# Patient Record
Sex: FEMALE | URBAN - METROPOLITAN AREA
[De-identification: names, ages, dates, MRNs, and addresses within clinical notes are randomized per-mention and may not be internally consistent; named-entity substitution may affect disease eponyms.]

---

## 2017-02-15 ENCOUNTER — APPOINTMENT (RX ONLY)
Dept: URBAN - METROPOLITAN AREA TELEMEDICINE 2 | Facility: TELEMEDICINE | Age: 42
Setting detail: DERMATOLOGY
End: 2017-02-15

## 2017-02-15 VITALS — HEIGHT: 60 IN | WEIGHT: 145 LBS

## 2017-02-15 DIAGNOSIS — Z41.1 ENCOUNTER FOR COSMETIC SURGERY: ICD-10-CM

## 2017-02-15 PROCEDURE — ? PRE-OP WORKLIST

## 2017-02-15 PROCEDURE — ? RECOMMENDATIONS

## 2017-02-15 PROCEDURE — ? CONSULTATION - BREAST (COSMETIC)

## 2017-02-15 ASSESSMENT — LOCATION SIMPLE DESCRIPTION DERM
LOCATION SIMPLE: LEFT BREAST
LOCATION SIMPLE: RIGHT BREAST

## 2017-02-15 ASSESSMENT — LOCATION DETAILED DESCRIPTION DERM
LOCATION DETAILED: LEFT AREOLA
LOCATION DETAILED: RIGHT AREOLA

## 2017-02-15 ASSESSMENT — LOCATION ZONE DERM: LOCATION ZONE: TRUNK

## 2017-02-15 NOTE — PROCEDURE: RECOMMENDATIONS
Recommendation Preamble: The following recommendations were made during the visit:
Detail Level: Zone
Recommendations (Free Text): 1. Mastopexy implant exchange with capsulorropathy. Exchange saline for silicone 600-750cc\\n\\n-plan for underwire bra one week after to reduce stress on internal sutures. Discussed post-op physical restrictions.\\n-advised patient that \"side cleavage\" is not expected to significantly improve with procedure\\n-patient reports percocet gives her hallucinations. Patient prefers \\n-advised patient she may have indentations on the skin post-operatively that are secondary to internal sutures

## 2017-02-15 NOTE — PROCEDURE: PRE-OP WORKLIST
Surgery Scheduled: Mastopexy implant exchange
Detail Level: Simple
Admission Status: outpatient
Photos Taken?: yes
Date Of Surgery: 02/16/17
Surgeon: Kareem Beasley MD

## 2017-02-17 ENCOUNTER — APPOINTMENT (RX ONLY)
Dept: URBAN - METROPOLITAN AREA CLINIC 5 | Facility: CLINIC | Age: 42
Setting detail: DERMATOLOGY
End: 2017-02-17

## 2017-02-17 DIAGNOSIS — Z42.8 ENCOUNTER FOR OTHER PLASTIC AND RECONSTRUCTIVE SURGERY FOLLOWING MEDICAL PROCEDURE OR HEALED INJURY: ICD-10-CM

## 2017-02-17 PROCEDURE — ? PATIENT SPECIFIC COUNSELING

## 2017-02-17 PROCEDURE — ? COUNSELING - POST-OP CHECK, IMPLANT EXCHANGE WITH MASTOPEXY

## 2017-02-17 ASSESSMENT — LOCATION SIMPLE DESCRIPTION DERM
LOCATION SIMPLE: LEFT BREAST
LOCATION SIMPLE: RIGHT BREAST

## 2017-02-17 ASSESSMENT — LOCATION ZONE DERM: LOCATION ZONE: TRUNK

## 2017-02-17 ASSESSMENT — LOCATION DETAILED DESCRIPTION DERM
LOCATION DETAILED: RIGHT MEDIAL BREAST 3-4:00 REGION
LOCATION DETAILED: LEFT MEDIAL BREAST 8-9:00 REGION

## 2017-02-17 NOTE — PROCEDURE: PATIENT SPECIFIC COUNSELING
Detail Level: Zone
Other (Free Text): -may start underwire bra next week\\n-do not massage\\n-do not stretch out arms\\n-implant card provided\\n-bra garment fitting is provided

## 2017-02-20 ENCOUNTER — APPOINTMENT (RX ONLY)
Dept: URBAN - METROPOLITAN AREA CLINIC 5 | Facility: CLINIC | Age: 42
Setting detail: DERMATOLOGY
End: 2017-02-20

## 2017-02-20 DIAGNOSIS — Z42.8 ENCOUNTER FOR OTHER PLASTIC AND RECONSTRUCTIVE SURGERY FOLLOWING MEDICAL PROCEDURE OR HEALED INJURY: ICD-10-CM

## 2017-02-20 PROCEDURE — ? COUNSELING - POST-OP CHECK, IMPLANT EXCHANGE WITH MASTOPEXY

## 2017-02-20 PROCEDURE — ? PATIENT SPECIFIC COUNSELING

## 2017-02-20 NOTE — HPI: POST-OP CHECK, MASTOPEXY
History: Patient is doing well s/p mastopexy implant exchange and capsulorropathy. She reports she has enjoyed her stay in [place] and has been eating and drinking at local restaurants. She reports being a little sore after overexerting herself.

## 2017-02-20 NOTE — PROCEDURE: PATIENT SPECIFIC COUNSELING
Other (Free Text): -apply neosporin to raw spot once dermabond falls off\\n-biocorneum cream discussed. purchased today\\n-may begin wearing underwire bra today. Patient brought bras in. The bras were inspected for proper fit.\\n-may start hormones in another week\\n-begin colace stool softener\\n-patient will return for f/u in March.\\n-patient was instructed to consciously reduce activity
Detail Level: Zone

## 2017-04-06 ENCOUNTER — APPOINTMENT (RX ONLY)
Dept: URBAN - METROPOLITAN AREA CLINIC 5 | Facility: CLINIC | Age: 42
Setting detail: DERMATOLOGY
End: 2017-04-06

## 2017-04-06 DIAGNOSIS — Z42.8 ENCOUNTER FOR OTHER PLASTIC AND RECONSTRUCTIVE SURGERY FOLLOWING MEDICAL PROCEDURE OR HEALED INJURY: ICD-10-CM

## 2017-04-06 PROCEDURE — ? COUNSELING - POST-OP CHECK, IMPLANT EXCHANGE WITH MASTOPEXY

## 2017-04-06 PROCEDURE — ? PATIENT SPECIFIC COUNSELING

## 2017-04-06 NOTE — PROCEDURE: PATIENT SPECIFIC COUNSELING
Other (Free Text): -prominent tan line along chest. Sun protection reviewed \\n-plain Vaseline to incisions. \\n-contact dermatitis resolving. D/c neosporin\\n-physical restrictions reviewed
Detail Level: Zone